# Patient Record
Sex: FEMALE | Race: WHITE | URBAN - METROPOLITAN AREA
[De-identification: names, ages, dates, MRNs, and addresses within clinical notes are randomized per-mention and may not be internally consistent; named-entity substitution may affect disease eponyms.]

---

## 2021-04-06 NOTE — IMPRESSION/PLAN
Impression: Keratoconjunctivitis sicca, bilateral
02/2021 [facility] 325, 316
9/2020 [facility] 310,321 improved 8/2020 [facility] 11 OU Plan: Pt notes OD > OS w dryness. PEK persists. Cont using Systane Complete AT's qid OU. ContO3's. Avoid ceiling fans. Cont PA 1% BID OU x 8 weeks. Consider IPL x 4 and Restasis/Xiidra/ Cequa in near future. 11/2020 0.4mm UltraPlug BLL- missing-replaced 04/2021 BLL 0.6mm PETITE [place], OD) intracanalicular DEC 8/2020. Lipiview/ transillumination results indicate OD 60%, OS 70% MG atrophy. Discussed options for treatment such as IPL  x 4 in order to maintain MG function. Pt aware of out of pocket cost $350.

## 2021-06-08 NOTE — IMPRESSION/PLAN
Impression: Keratoconjunctivitis sicca, bilateral
02/2021 [facility] 325, 316
06/2021 [facility] 317, 322
8/2020 [facility] 11 OU Plan: PEK persists OD > OS. Symptoms improved some. Cont using Systane Complete AT's qid OU. ContO3's. Avoid ceiling fans. Cont PA 1% BID OU x 4 wks. Consider IPL x 4 and Restasis/Xiidra/ Cequa in near future. 06/2021 missing BLL replaced w 0.5mm UltraPlug DEC 8/2020. Lipiview/ transillumination results indicate OD 60%, OS 70% MG atrophy. Discussed options for treatment such as IPL  x 4 in order to maintain MG function. Pt aware of out of pocket cost $350.

## 2021-06-08 NOTE — IMPRESSION/PLAN
Impression: Hordeolum internum (Meibomian gland dysfunction), right upper lid Plan: See other notes.  Cont WC's qhs OU and Eryth qhs to lids

## 2021-09-09 NOTE — IMPRESSION/PLAN
Impression: Chalazion of left lower eyelid: H00.15. Plan: cont WCs and Eryth pamela. Start doxy 100 mg bid PO x 10 days. see other notes.

## 2021-09-09 NOTE — IMPRESSION/PLAN
Impression: Keratoconjunctivitis sicca, bilateral
02/2021 [facility] 325, 316
06/2021 [facility] 317, 322
8/2020 [facility] 11 OU Plan: PEK persists OD > OS. Symptoms improved some. Cont using Systane Complete AT's qid OU. ContO3's. Avoid ceiling fans. Finished PA 1% BID OU. Consider IPL x 4.  start Restasis bid OU.

9/2021; missing BLL replaced with large  BVI

DEC 8/2020. Lipiview/ transillumination results indicate OD 60%, OS 70% MG atrophy. Discussed options for treatment such as IPL  x 4 in order to maintain MG function. Pt aware of out of pocket cost $350.

## 2022-04-26 ENCOUNTER — OFFICE VISIT (OUTPATIENT)
Dept: URBAN - METROPOLITAN AREA CLINIC 30 | Facility: CLINIC | Age: 72
End: 2022-04-26
Payer: MEDICARE

## 2022-04-26 DIAGNOSIS — H00.021 HORDEOLUM INTERNUM RIGHT UPPER EYELID: ICD-10-CM

## 2022-04-26 DIAGNOSIS — H16.223 KERATOCONJUNCTIVITIS SICCA, NOT SPECIFIED AS SJOGREN'S, BILATERAL: Primary | ICD-10-CM

## 2022-04-26 DIAGNOSIS — H00.15 CHALAZION OF LEFT LOWER EYELID: ICD-10-CM

## 2022-04-26 DIAGNOSIS — H43.392 OTHER VITREOUS OPACITIES, LEFT EYE: ICD-10-CM

## 2022-04-26 PROCEDURE — 92134 CPTRZ OPH DX IMG PST SGM RTA: CPT | Performed by: OPTOMETRIST

## 2022-04-26 PROCEDURE — 92014 COMPRE OPH EXAM EST PT 1/>: CPT | Performed by: OPTOMETRIST

## 2022-04-26 RX ORDER — PREDNISOLONE ACETATE 10 MG/ML
1 % SUSPENSION/ DROPS OPHTHALMIC
Qty: 5 | Refills: 0 | Status: ACTIVE
Start: 2022-04-26

## 2022-04-26 RX ORDER — CYCLOSPORINE 0.5 MG/ML
0.05 % EMULSION OPHTHALMIC
Qty: 180 | Refills: 6 | Status: ACTIVE
Start: 2022-04-26

## 2022-04-26 ASSESSMENT — INTRAOCULAR PRESSURE
OS: 17
OD: 17

## 2022-04-26 ASSESSMENT — KERATOMETRY
OS: 45.06
OD: 44.52

## 2022-04-26 ASSESSMENT — VISUAL ACUITY
OD: 20/30
OS: 20/30

## 2022-04-26 NOTE — IMPRESSION/PLAN
Impression: Chalazion of left lower eyelid: H00.15. Plan: Cont use of WCs and Eryth pamela. Prev finished doxy 100 mg bid PO x 10 days. see other notes.

## 2022-04-26 NOTE — IMPRESSION/PLAN
Impression: Hordeolum internum (Meibomian gland dysfunction), right upper lid Plan: Cont use of WC's qhs OU and Eryth qhs to lids.

## 2022-04-26 NOTE — IMPRESSION/PLAN
Impression: Other vitreous opacities, left eye Plan: No evidence of RD or tear noted. All signs and risks of retinal detachment or tears were discussed in detail. If pt. notices any symptoms discussed, contact office ASAP. Recommend pt. return for normal recall. See mac oct for findings.

## 2022-04-26 NOTE — IMPRESSION/PLAN
Impression: Keratoconjunctivitis sicca, bilateral
+heart disease 02/2021 [facility] 325, 316
06/2021 [facility] 317, 322
8/2020 [facility] 11 OU Plan: PEK persists OD > OS. Symptoms improved some. PF ATS BID OU. Cont using Systane Complete AT's qid OU. ContO3's. Avoid ceiling fans. Restart PA 1% BID OU- rx'd. Consider IPL x 4. Did not start-- rx'd today: Restasis bid OU. Consider tearvya. Rec IPL and DEC at next visit. * replace next visit RLL plug missing today. 9/2021; large BVI 

DEC 8/2020. Lipiview/ transillumination results indicate OD 60%, OS 70% MG atrophy. Discussed options for treatment such as IPL  x 4 in order to maintain MG function.  Pt aware of out of pocket cost $350

## 2022-06-13 ENCOUNTER — OFFICE VISIT (OUTPATIENT)
Dept: URBAN - METROPOLITAN AREA CLINIC 30 | Facility: CLINIC | Age: 72
End: 2022-06-13
Payer: MEDICARE

## 2022-06-13 DIAGNOSIS — H16.223 KERATOCONJUNCTIVITIS SICCA, NOT SPECIFIED AS SJOGREN'S, BILATERAL: Primary | ICD-10-CM

## 2022-06-13 PROCEDURE — 68761 CLOSE TEAR DUCT OPENING: CPT | Performed by: OPTOMETRIST

## 2022-06-13 RX ORDER — FLUOROMETHOLONE 1 MG/ML
0.1 % SUSPENSION/ DROPS OPHTHALMIC
Qty: 5 | Refills: 0 | Status: ACTIVE
Start: 2022-06-13

## 2022-06-13 ASSESSMENT — INTRAOCULAR PRESSURE
OD: 14
OS: 19

## 2022-06-13 NOTE — IMPRESSION/PLAN
Impression: Keratoconjunctivitis sicca, bilateral  ***GOES BY NEGRO***
+heart disease 02/2021 [facility] 325, 316
06/2021 [facility] 317, 322
8/2020 [facility] 11 OU Plan: PEK improving. Continue PF ATS BID OU. Continue using Refresh AT's qid OU. Rec Retaine- gave sample today. ContO3's. Avoid ceiling fans. Switch from PA 1% BID OU to FML 0.1% BID OU due to minor IOP spike-rx'd. Consider IPL x 4. Continue Restasis bid OU. Consider tearvya. Consider IPL. *discuss MGD next visit. *replace next visit RLL plug- gave time for healing due to partial extrusion. 06/2022 LLL plug partially extruded. Placed RLL Ultra Plug 0.5mm today 09/2021; large BVI 

DEC 8/2020. Lipiview/ transillumination results indicate OD 60%, OS 70% MG atrophy. Discussed options for treatment such as IPL  x 4 in order to maintain MG function.  Pt aware of out of pocket cost $350

## 2022-08-01 ENCOUNTER — OFFICE VISIT (OUTPATIENT)
Dept: URBAN - METROPOLITAN AREA CLINIC 30 | Facility: CLINIC | Age: 72
End: 2022-08-01
Payer: MEDICARE

## 2022-08-01 DIAGNOSIS — H16.223 KERATOCONJUNCTIVITIS SICCA, NOT SPECIFIED AS SJOGREN'S, BILATERAL: Primary | ICD-10-CM

## 2022-08-01 PROCEDURE — 83861 MICROFLUID ANALY TEARS: CPT | Performed by: OPTOMETRIST

## 2022-08-01 PROCEDURE — 68761 CLOSE TEAR DUCT OPENING: CPT | Performed by: OPTOMETRIST

## 2022-08-01 ASSESSMENT — INTRAOCULAR PRESSURE
OS: 18
OD: 20

## 2022-08-01 NOTE — IMPRESSION/PLAN
Impression: Keratoconjunctivitis sicca, bilateral 
 ***GOES BY NEGRO***
+heart disease 08/2022 [facility] 320, 339 06/2021 [facility] 317, 322 02/2021 [facility] 325, 316
8/2020 [facility] 11 OU Plan: Again, PEK improving. Continue PF ATS BID OU. Continue using Refresh AT's qid OU. Cont Retaine. ContO3's. Avoid ceiling fans. Switch from PA 1% BID OU to FML 0.1% BID OU due to minor IOP spike-rx'd. Consider IPL x 4. Will resume Restasis bid OU. Consider tearvya. Consider IPL. BLL plugs missing-- if missing again, will bump up one more size. 08/202 placed BLL 0.5 UltraPlugs. Hx of LLL being partially extruded. 09/2021; large BVI 

DEC 8/2020. Lipiview/ transillumination results indicate OD 60%, OS 70% MG atrophy. Discussed options for treatment such as IPL  x 4 in order to maintain MG function.  Pt aware of out of pocket cost $350

## 2022-11-15 ENCOUNTER — OFFICE VISIT (OUTPATIENT)
Dept: URBAN - METROPOLITAN AREA CLINIC 30 | Facility: CLINIC | Age: 72
End: 2022-11-15
Payer: MEDICARE

## 2022-11-15 DIAGNOSIS — H40.053 OCULAR HYPERTENSION, BILATERAL: ICD-10-CM

## 2022-11-15 DIAGNOSIS — H16.223 KERATOCONJUNCTIVITIS SICCA, NOT SPECIFIED AS SJOGREN'S, BILATERAL: Primary | ICD-10-CM

## 2022-11-15 PROCEDURE — 83861 MICROFLUID ANALY TEARS: CPT | Performed by: OPTOMETRIST

## 2022-11-15 ASSESSMENT — INTRAOCULAR PRESSURE
OS: 20
OD: 20

## 2022-11-15 NOTE — IMPRESSION/PLAN
Impression: Keratoconjunctivitis sicca, bilateral 
 ***GOES BY NEGRO***
+heart disease; MILD STEROID RESPONSE
11/2022 [facility] 316, 327 | 08/2022 [facility] 320, 339 06/2021 [facility] 317, 322 02/2021| [facility] 325, 316
8/2020 [facility] 11 OU Plan: Again, PEK improving OU. Pt admits to rubbing OD>OS. Cont Systane PF ATS BID OU. O3's. Avoid ceiling fans. Cont Restasis bid OU. Cont WCs. Consider tyrvaya. Consider IPL x 4. BLL plugs missing. 11/2022 placed BLL c 0.5mm UltraPlug. Consent form signed. 08/2022 placed BLL 0.5 UltraPlugs. Hx of LLL being partially extruded. DEC 8/2020. Lipiview/ transillumination results indicate OD 60%, OS 70% MG atrophy. Discussed options for treatment such as IPL  x 4 in order to maintain MG function.  Pt aware of out of pocket cost $350

## 2023-03-22 ENCOUNTER — OFFICE VISIT (OUTPATIENT)
Dept: URBAN - METROPOLITAN AREA CLINIC 30 | Facility: CLINIC | Age: 73
End: 2023-03-22
Payer: MEDICARE

## 2023-03-22 DIAGNOSIS — H00.021 HORDEOLUM INTERNUM RIGHT UPPER EYELID: ICD-10-CM

## 2023-03-22 DIAGNOSIS — H25.813 COMBINED FORMS OF AGE-RELATED CATARACT, BILATERAL: ICD-10-CM

## 2023-03-22 DIAGNOSIS — H40.053 OCULAR HYPERTENSION, BILATERAL: ICD-10-CM

## 2023-03-22 DIAGNOSIS — H43.392 OTHER VITREOUS OPACITIES, LEFT EYE: ICD-10-CM

## 2023-03-22 DIAGNOSIS — H16.223 KERATOCONJUNCTIVITIS SICCA, NOT SPECIFIED AS SJOGREN'S, BILATERAL: Primary | ICD-10-CM

## 2023-03-22 PROCEDURE — 68761 CLOSE TEAR DUCT OPENING: CPT | Performed by: OPTOMETRIST

## 2023-03-22 ASSESSMENT — INTRAOCULAR PRESSURE
OD: 17
OS: 16

## 2023-03-22 ASSESSMENT — VISUAL ACUITY
OD: 20/25
OS: 20/25

## 2023-03-22 ASSESSMENT — KERATOMETRY
OD: 44.38
OS: 44.83

## 2023-03-22 NOTE — IMPRESSION/PLAN
Impression: Hordeolum internum (Meibomian gland dysfunction), right upper lid Plan: Cont use of WC's qhs OU. Previous Eryth.

## 2023-03-22 NOTE — IMPRESSION/PLAN
Impression: Keratoconjunctivitis sicca, bilateral 
 ***GOES BY NEGRO***
+heart disease; MILD STEROID RESPONSE
11/2022 [facility] 316, 327 | 08/2022 [facility] 320, 339 06/2021 [facility] 317, 322 02/2021| [facility] 325, 316
8/2020 [facility] 11 OU Plan: Again, PEK improving OU. Pt admits to rubbing OD>OS. Consider tyrvaya. Consider IPL x 4. PLAN:  Cont Systane PF ATS BID OU. O3's. Avoid ceiling fans. Cont Restasis bid OU. Cont WCs. 

3/2023 RLL plug missing. LLL partially protruding. Replaced c Large BVI. Consent form singed. 11/2022 placed BLL c 0.5mm UltraPlug. Consent form signed. 08/2022 placed BLL 0.5 UltraPlugs. Hx of LLL being partially extruded. DEC 8/2020. Lipiview/ transillumination results indicate OD 60%, OS 70% MG atrophy. Discussed options for treatment such as IPL  x 4 in order to maintain MG function.  Pt aware of out of pocket cost $350

## 2023-03-22 NOTE — IMPRESSION/PLAN
Impression: Other vitreous opacities, left eye Plan: No evidence of RD or tear noted. All signs and risks of retinal detachment or tears were discussed in detail. If pt. notices any symptoms discussed, contact office ASAP. Recommend pt. return for normal recall. Monitor.

## 2023-08-21 ENCOUNTER — OFFICE VISIT (OUTPATIENT)
Dept: URBAN - METROPOLITAN AREA CLINIC 30 | Facility: CLINIC | Age: 73
End: 2023-08-21
Payer: MEDICARE

## 2023-08-21 DIAGNOSIS — H00.021 HORDEOLUM INTERNUM RIGHT UPPER EYELID: ICD-10-CM

## 2023-08-21 DIAGNOSIS — H16.223 KERATOCONJUNCTIVITIS SICCA, NOT SPECIFIED AS SJOGREN'S, BILATERAL: Primary | ICD-10-CM

## 2023-08-21 PROCEDURE — 83861 MICROFLUID ANALY TEARS: CPT | Performed by: OPTOMETRIST

## 2023-08-21 PROCEDURE — 99214 OFFICE O/P EST MOD 30 MIN: CPT | Performed by: OPTOMETRIST

## 2023-08-21 ASSESSMENT — INTRAOCULAR PRESSURE
OS: 16
OD: 20

## 2023-11-28 ENCOUNTER — OFFICE VISIT (OUTPATIENT)
Dept: URBAN - METROPOLITAN AREA CLINIC 30 | Facility: CLINIC | Age: 73
End: 2023-11-28
Payer: MEDICARE

## 2023-11-28 DIAGNOSIS — H16.223 KERATOCONJUNCT SICCA, NOT SPECIFIED AS SJOGREN'S, BILATERAL: Primary | ICD-10-CM

## 2023-11-28 PROCEDURE — 99203 OFFICE O/P NEW LOW 30 MIN: CPT | Performed by: OPHTHALMOLOGY

## 2023-12-05 ENCOUNTER — OFFICE VISIT (OUTPATIENT)
Dept: URBAN - METROPOLITAN AREA CLINIC 30 | Facility: CLINIC | Age: 73
End: 2023-12-05
Payer: MEDICARE

## 2023-12-05 DIAGNOSIS — H00.021 HORDEOLUM INTERNUM RIGHT UPPER EYELID: ICD-10-CM

## 2023-12-05 PROCEDURE — 99213 OFFICE O/P EST LOW 20 MIN: CPT | Performed by: OPTOMETRIST

## 2023-12-05 PROCEDURE — 83861 MICROFLUID ANALY TEARS: CPT | Performed by: OPTOMETRIST

## 2023-12-05 ASSESSMENT — INTRAOCULAR PRESSURE
OD: 16
OS: 16

## 2024-03-05 ENCOUNTER — OFFICE VISIT (OUTPATIENT)
Dept: URBAN - METROPOLITAN AREA CLINIC 30 | Facility: CLINIC | Age: 74
End: 2024-03-05
Payer: MEDICARE

## 2024-03-05 DIAGNOSIS — H16.223 KERATOCONJUNCTIVITIS SICCA, NOT SPECIFIED AS SJOGREN'S, BILATERAL: ICD-10-CM

## 2024-03-05 DIAGNOSIS — H25.813 COMBINED FORMS OF AGE-RELATED CATARACT, BILATERAL: ICD-10-CM

## 2024-03-05 DIAGNOSIS — H43.392 OTHER VITREOUS OPACITIES, LEFT EYE: ICD-10-CM

## 2024-03-05 DIAGNOSIS — H40.053 OCULAR HYPERTENSION, BILATERAL: Primary | ICD-10-CM

## 2024-03-05 PROCEDURE — 99213 OFFICE O/P EST LOW 20 MIN: CPT | Performed by: OPTOMETRIST

## 2024-03-05 PROCEDURE — 76514 ECHO EXAM OF EYE THICKNESS: CPT | Performed by: OPTOMETRIST

## 2024-03-05 ASSESSMENT — VISUAL ACUITY
OD: 20/25
OS: 20/25

## 2024-03-05 ASSESSMENT — KERATOMETRY
OS: 44.77
OD: 44.47

## 2024-03-05 ASSESSMENT — INTRAOCULAR PRESSURE
OD: 13
OS: 12

## 2024-09-03 ENCOUNTER — OFFICE VISIT (OUTPATIENT)
Dept: URBAN - METROPOLITAN AREA CLINIC 30 | Facility: CLINIC | Age: 74
End: 2024-09-03
Payer: MEDICARE

## 2024-09-03 DIAGNOSIS — H16.223 KERATOCONJUNCTIVITIS SICCA, NOT SPECIFIED AS SJOGREN'S, BILATERAL: Primary | ICD-10-CM

## 2024-09-03 PROCEDURE — 99213 OFFICE O/P EST LOW 20 MIN: CPT | Performed by: OPTOMETRIST

## 2024-09-03 ASSESSMENT — INTRAOCULAR PRESSURE
OS: 14
OD: 15

## 2025-01-07 ENCOUNTER — OFFICE VISIT (OUTPATIENT)
Dept: URBAN - METROPOLITAN AREA CLINIC 30 | Facility: CLINIC | Age: 75
End: 2025-01-07
Payer: MEDICARE

## 2025-01-07 DIAGNOSIS — H16.223 KERATOCONJUNCTIVITIS SICCA, NOT SPECIFIED AS SJOGREN'S, BILATERAL: Primary | ICD-10-CM

## 2025-01-07 PROCEDURE — 83861 MICROFLUID ANALY TEARS: CPT | Performed by: OPTOMETRIST

## 2025-01-07 PROCEDURE — 99213 OFFICE O/P EST LOW 20 MIN: CPT | Performed by: OPTOMETRIST

## 2025-01-07 RX ORDER — CYCLOSPORINE 0.5 MG/ML
0.05 % EMULSION OPHTHALMIC
Qty: 180 | Refills: 6 | Status: ACTIVE
Start: 2025-01-07

## 2025-01-07 RX ORDER — CYCLOSPORINE 0.5 MG/ML
0.05 % EMULSION OPHTHALMIC
Qty: 180 | Refills: 6 | Status: INACTIVE
Start: 2025-01-07 | End: 2025-01-07

## 2025-01-07 ASSESSMENT — INTRAOCULAR PRESSURE
OS: 16
OD: 17

## 2025-04-16 ENCOUNTER — OFFICE VISIT (OUTPATIENT)
Dept: URBAN - METROPOLITAN AREA CLINIC 30 | Facility: CLINIC | Age: 75
End: 2025-04-16

## 2025-04-16 DIAGNOSIS — H02.889 MEIBOMIAN GLAND DYSFUNCTION OF UNSPECIFIED EYE, UNSPECIFIED EYELID: ICD-10-CM

## 2025-04-16 DIAGNOSIS — H16.223 KERATOCONJUNCTIVITIS SICCA, NOT SPECIFIED AS SJOGREN'S, BILATERAL: Primary | ICD-10-CM

## 2025-05-14 ENCOUNTER — OFFICE VISIT (OUTPATIENT)
Dept: URBAN - METROPOLITAN AREA CLINIC 30 | Facility: CLINIC | Age: 75
End: 2025-05-14

## 2025-05-14 DIAGNOSIS — H02.889 MEIBOMIAN GLAND DYSFUNCTION OF EYE: ICD-10-CM

## 2025-05-14 DIAGNOSIS — H16.223 KERATOCONJUNCTIVITIS SICCA, NOT SPECIFIED AS SJOGREN'S, BILATERAL: Primary | ICD-10-CM

## 2025-06-09 ENCOUNTER — OFFICE VISIT (OUTPATIENT)
Dept: URBAN - METROPOLITAN AREA CLINIC 30 | Facility: CLINIC | Age: 75
End: 2025-06-09

## 2025-06-09 DIAGNOSIS — H00.024 HORDEOLUM INTERNUM LEFT UPPER EYELID: ICD-10-CM

## 2025-06-09 DIAGNOSIS — H16.223 KERATOCONJUNCTIVITIS SICCA, NOT SPECIFIED AS SJOGREN'S, BILATERAL: Primary | ICD-10-CM

## 2025-07-09 ENCOUNTER — OFFICE VISIT (OUTPATIENT)
Dept: URBAN - METROPOLITAN AREA CLINIC 30 | Facility: CLINIC | Age: 75
End: 2025-07-09

## 2025-07-09 DIAGNOSIS — H02.889 MEIBOMIAN GLAND DYSFUNCTION OF EYE: ICD-10-CM

## 2025-07-09 DIAGNOSIS — H16.223 KERATOCONJUNCTIVITIS SICCA, NOT SPECIFIED AS SJOGREN'S, BILATERAL: Primary | ICD-10-CM
